# Patient Record
Sex: FEMALE | Race: WHITE | NOT HISPANIC OR LATINO | Employment: UNEMPLOYED | URBAN - NONMETROPOLITAN AREA
[De-identification: names, ages, dates, MRNs, and addresses within clinical notes are randomized per-mention and may not be internally consistent; named-entity substitution may affect disease eponyms.]

---

## 2017-03-23 ENCOUNTER — OFFICE VISIT (OUTPATIENT)
Dept: OBSTETRICS AND GYNECOLOGY | Facility: CLINIC | Age: 37
End: 2017-03-23

## 2017-03-23 VITALS
DIASTOLIC BLOOD PRESSURE: 68 MMHG | HEART RATE: 93 BPM | SYSTOLIC BLOOD PRESSURE: 103 MMHG | BODY MASS INDEX: 24.54 KG/M2 | HEIGHT: 60 IN | WEIGHT: 125 LBS

## 2017-03-23 DIAGNOSIS — Z01.419 ENCOUNTER FOR GYNECOLOGICAL EXAMINATION WITHOUT ABNORMAL FINDING: Primary | ICD-10-CM

## 2017-03-23 DIAGNOSIS — Z79.890 HORMONE REPLACEMENT THERAPY (POSTMENOPAUSAL): ICD-10-CM

## 2017-03-23 DIAGNOSIS — Z90.710 H/O HYSTERECTOMY FOR BENIGN DISEASE: ICD-10-CM

## 2017-03-23 PROCEDURE — 99395 PREV VISIT EST AGE 18-39: CPT | Performed by: NURSE PRACTITIONER

## 2017-03-23 RX ORDER — ESTRADIOL 0.1 MG/D
1 PATCH, EXTENDED RELEASE TRANSDERMAL 2 TIMES WEEKLY
Qty: 8 PATCH | Refills: 12 | Status: SHIPPED | OUTPATIENT
Start: 2017-03-23 | End: 2017-12-01 | Stop reason: SDUPTHER

## 2017-03-23 NOTE — PROGRESS NOTES
Subjective   Karla Perez is a 36 y.o.  here for annual exam and HRT refills. Has c/o not being able to stay asleep during the night.    Gynecologic Exam   The patient's pertinent negatives include no genital itching, genital lesions, genital odor, genital rash, vaginal bleeding or vaginal discharge. Pertinent negatives include no abdominal pain, dysuria, headaches or rash. She is sexually active. No, her partner does not have an STD. She uses hysterectomy for contraception. She is postmenopausal. Her past medical history is significant for endometriosis, a gynecological surgery, menorrhagia and ovarian cysts.   ANNETTE/BSO in  for endometriosis and ovarian cyst  No family hx of breast cancer.     The following portions of the patient's history were reviewed and updated as appropriate: allergies, current medications, past family history, past medical history, past social history, past surgical history and problem list.    Review of Systems   Constitutional: Negative for activity change, appetite change, fatigue and unexpected weight change.   Respiratory: Negative for apnea, chest tightness and shortness of breath.    Cardiovascular: Negative for chest pain, palpitations and leg swelling.   Gastrointestinal: Negative for abdominal distention and abdominal pain.   Genitourinary: Positive for menorrhagia. Negative for difficulty urinating, dyspareunia, dysuria, vaginal bleeding, vaginal discharge and vaginal pain.   Musculoskeletal: Negative for gait problem and myalgias.   Skin: Negative for color change and rash.   Neurological: Negative for weakness, light-headedness and headaches.   Hematological: Negative for adenopathy.   Psychiatric/Behavioral: Positive for sleep disturbance. Negative for decreased concentration and dysphoric mood. The patient is not nervous/anxious.        Objective   Physical Exam   Constitutional: She is oriented to person, place, and time. She appears well-developed and  well-nourished.   Cardiovascular: Normal rate, regular rhythm, normal heart sounds and intact distal pulses.    Pulmonary/Chest: Effort normal and breath sounds normal. Right breast exhibits no inverted nipple, no mass, no nipple discharge, no skin change and no tenderness. Left breast exhibits no inverted nipple, no mass, no nipple discharge, no skin change and no tenderness. Breasts are symmetrical. There is no breast swelling.   Bilateral breast implants intact.   Abdominal: Soft. Bowel sounds are normal. She exhibits no distension. There is no tenderness.   Genitourinary: No breast bleeding.   Genitourinary Comments: Pap smear not indicated. No GYN complaints. Pelvic exam deferred.   Lymphadenopathy:     She has no axillary adenopathy.   Neurological: She is alert and oriented to person, place, and time.   Skin: Skin is warm, dry and intact.   Psychiatric: She has a normal mood and affect. Her behavior is normal.   Nursing note and vitals reviewed.        Assessment/Plan   Karla was seen today for gynecologic exam.    Diagnoses and all orders for this visit:    Encounter for gynecological examination without abnormal finding    H/O hysterectomy for benign disease    Hormone replacement therapy (postmenopausal)    Other orders  -     VIVELLE-DOT 0.1 MG/24HR patch; Place 1 patch on the skin 2 (Two) Times a Week.

## 2017-07-19 PROCEDURE — 87077 CULTURE AEROBIC IDENTIFY: CPT | Performed by: NURSE PRACTITIONER

## 2017-07-19 PROCEDURE — 87480 CANDIDA DNA DIR PROBE: CPT | Performed by: NURSE PRACTITIONER

## 2017-07-19 PROCEDURE — 87660 TRICHOMONAS VAGIN DIR PROBE: CPT | Performed by: NURSE PRACTITIONER

## 2017-07-19 PROCEDURE — 87510 GARDNER VAG DNA DIR PROBE: CPT | Performed by: NURSE PRACTITIONER

## 2017-07-19 PROCEDURE — 87086 URINE CULTURE/COLONY COUNT: CPT | Performed by: NURSE PRACTITIONER

## 2017-07-19 PROCEDURE — 87186 SC STD MICRODIL/AGAR DIL: CPT | Performed by: NURSE PRACTITIONER

## 2017-09-27 ENCOUNTER — TELEPHONE (OUTPATIENT)
Dept: GENERAL RADIOLOGY | Facility: HOSPITAL | Age: 37
End: 2017-09-27

## 2017-12-01 ENCOUNTER — OFFICE VISIT (OUTPATIENT)
Dept: OBSTETRICS AND GYNECOLOGY | Facility: CLINIC | Age: 37
End: 2017-12-01

## 2017-12-01 VITALS
BODY MASS INDEX: 20.58 KG/M2 | DIASTOLIC BLOOD PRESSURE: 82 MMHG | SYSTOLIC BLOOD PRESSURE: 119 MMHG | HEIGHT: 61 IN | HEART RATE: 81 BPM | WEIGHT: 109 LBS

## 2017-12-01 DIAGNOSIS — Z79.890 HORMONE REPLACEMENT THERAPY (POSTMENOPAUSAL): ICD-10-CM

## 2017-12-01 DIAGNOSIS — Z90.710 H/O HYSTERECTOMY FOR BENIGN DISEASE: ICD-10-CM

## 2017-12-01 DIAGNOSIS — N89.8 VAGINAL ODOR: Primary | ICD-10-CM

## 2017-12-01 PROCEDURE — 99213 OFFICE O/P EST LOW 20 MIN: CPT | Performed by: NURSE PRACTITIONER

## 2017-12-01 RX ORDER — METRONIDAZOLE 7.5 MG/G
GEL VAGINAL 2 TIMES DAILY
Qty: 70 G | Refills: 0 | Status: SHIPPED | OUTPATIENT
Start: 2017-12-01 | End: 2018-04-20

## 2017-12-01 RX ORDER — ESTRADIOL 0.1 MG/D
1 PATCH, EXTENDED RELEASE TRANSDERMAL 2 TIMES WEEKLY
Qty: 8 PATCH | Refills: 12 | Status: SHIPPED | OUTPATIENT
Start: 2017-12-04 | End: 2018-04-04 | Stop reason: SDUPTHER

## 2017-12-01 NOTE — PROGRESS NOTES
"Subjective   Chief Complaint   Patient presents with   • Vaginal Discharge     Karla Perez is a 37 y.o. year old No obstetric history on file. presenting to be seen for evaluation of an abnormal vaginal discharge. The discharge is watery and white.  Her symptoms have been present for 2 week(s).  Additional she has noticed odor, UTI symptoms (dysuria) and vulvar itching.    She is sexually active.  In the past 12 months there has not been new sexual partners.  Condoms are not typically used.  She would not like to be screened for STD's at today's exam.     Prior to the onset of symptoms she was not on systemic antibiotics.  She has not recently changed soaps/detergents/toilet tissue.  Prior to this visit, she has used doxycycline in an attempt to improve her symptoms.    No LMP recorded. Patient has had a hysterectomy.    Current birth control method: status post hysterectomy and post menopausal status.    No Additional Complaints Reported    The following portions of the patient's history were reviewed and updated as appropriate:problem list, current medications, allergies, past medical history, past social history and past surgical history    Review of Systems   Constitutional: Negative for activity change, appetite change, fatigue and unexpected weight change.   Respiratory: Negative for chest tightness and shortness of breath.    Cardiovascular: Negative for chest pain, palpitations and leg swelling.   Gastrointestinal: Negative for abdominal distention, abdominal pain, nausea and vomiting.   Endocrine: Negative.    Genitourinary: Positive for vaginal discharge. Negative for difficulty urinating, dyspareunia, dysuria, genital sores, pelvic pain, urgency, vaginal bleeding and vaginal pain.         Objective   /82  Pulse 81  Ht 61\" (154.9 cm)  Wt 109 lb (49.4 kg)  Breastfeeding? No  BMI 20.6 kg/m2    General:  well developed; well nourished  no acute distress   Skin:  No suspicious lesions seen "   Pelvis: Clinical staff was present for exam  External genitalia:  normal appearance of the external genitalia including Bartholin's and [place]'s glands.  :  urethral meatus normal;  Vaginal:  normal pink mucosa without prolapse or lesions. discharge present -  watery and white;  Cervix:  absent.  Uterus:  absent.  Adnexa:  absent, bilateral.   One Swab obtained.     Lab Review   No data reviewed    Imaging   No data reviewed         Diagnoses and all orders for this visit:    Vaginal odor    Hormone replacement therapy (postmenopausal)    H/O hysterectomy for benign disease    Other orders  -     VIVELLE-DOT 0.1 MG/24HR patch; Place 1 patch on the skin 2 (Two) Times a Week.  -     metroNIDAZOLE (METROGEL VAGINAL) 0.75 % vaginal gel; Insert  into the vagina 2 (Two) Times a Day.        New Medications Ordered This Visit   Medications   • VIVELLE-DOT 0.1 MG/24HR patch     Sig: Place 1 patch on the skin 2 (Two) Times a Week.     Dispense:  8 patch     Refill:  12   • metroNIDAZOLE (METROGEL VAGINAL) 0.75 % vaginal gel     Sig: Insert  into the vagina 2 (Two) Times a Day.     Dispense:  70 g     Refill:  0         This note was electronically signed.    Yolande Ramirez, DANN  December 1, 2017

## 2017-12-07 ENCOUNTER — TELEPHONE (OUTPATIENT)
Dept: OBSTETRICS AND GYNECOLOGY | Facility: CLINIC | Age: 37
End: 2017-12-07

## 2017-12-07 RX ORDER — METRONIDAZOLE 500 MG/1
500 TABLET ORAL 2 TIMES DAILY
Qty: 14 TABLET | Refills: 0 | Status: SHIPPED | OUTPATIENT
Start: 2017-12-07 | End: 2017-12-14

## 2017-12-07 RX ORDER — AMOXICILLIN AND CLAVULANATE POTASSIUM 875; 125 MG/1; MG/1
1 TABLET, FILM COATED ORAL 2 TIMES DAILY
Qty: 14 TABLET | Refills: 0 | Status: SHIPPED | OUTPATIENT
Start: 2017-12-07 | End: 2017-12-14

## 2017-12-07 RX ORDER — FLUCONAZOLE 150 MG/1
TABLET ORAL
Qty: 2 TABLET | Refills: 0 | Status: SHIPPED | OUTPATIENT
Start: 2017-12-07 | End: 2018-04-20

## 2017-12-07 NOTE — TELEPHONE ENCOUNTER
Called and informed the pt of One Swab results and all the rx that she needs to be taking.  Pt verbalized understanding and I sent in Rx to [facility].

## 2017-12-07 NOTE — TELEPHONE ENCOUNTER
Thought she was suppose to get an antibiotic to take after intercourse but I want her to complete the abx for her current infections, then if she's still having issues after intercourse I will prescribe the doxy PRN.

## 2018-02-28 PROCEDURE — 87480 CANDIDA DNA DIR PROBE: CPT | Performed by: NURSE PRACTITIONER

## 2018-02-28 PROCEDURE — 87510 GARDNER VAG DNA DIR PROBE: CPT | Performed by: NURSE PRACTITIONER

## 2018-02-28 PROCEDURE — 87660 TRICHOMONAS VAGIN DIR PROBE: CPT | Performed by: NURSE PRACTITIONER

## 2018-04-06 RX ORDER — ESTRADIOL 0.1 MG/D
1 FILM, EXTENDED RELEASE TRANSDERMAL 2 TIMES WEEKLY
Qty: 8 PATCH | Refills: 11 | Status: SHIPPED | OUTPATIENT
Start: 2018-04-09 | End: 2018-06-19 | Stop reason: SDUPTHER

## 2018-05-31 PROCEDURE — 87480 CANDIDA DNA DIR PROBE: CPT | Performed by: FAMILY MEDICINE

## 2018-05-31 PROCEDURE — 87660 TRICHOMONAS VAGIN DIR PROBE: CPT | Performed by: FAMILY MEDICINE

## 2018-05-31 PROCEDURE — 87510 GARDNER VAG DNA DIR PROBE: CPT | Performed by: FAMILY MEDICINE

## 2018-06-19 ENCOUNTER — OFFICE VISIT (OUTPATIENT)
Dept: OBSTETRICS AND GYNECOLOGY | Facility: CLINIC | Age: 38
End: 2018-06-19

## 2018-06-19 VITALS
SYSTOLIC BLOOD PRESSURE: 106 MMHG | BODY MASS INDEX: 21.52 KG/M2 | DIASTOLIC BLOOD PRESSURE: 68 MMHG | HEIGHT: 61 IN | WEIGHT: 114 LBS

## 2018-06-19 DIAGNOSIS — N76.1 CHRONIC VAGINITIS: ICD-10-CM

## 2018-06-19 DIAGNOSIS — Z79.890 HORMONE REPLACEMENT THERAPY (POSTMENOPAUSAL): Primary | ICD-10-CM

## 2018-06-19 PROCEDURE — 99213 OFFICE O/P EST LOW 20 MIN: CPT | Performed by: OBSTETRICS & GYNECOLOGY

## 2018-06-19 RX ORDER — METRONIDAZOLE 500 MG/1
500 TABLET ORAL DAILY
Qty: 30 TABLET | Refills: 2 | Status: SHIPPED | OUTPATIENT
Start: 2018-06-19 | End: 2018-12-18 | Stop reason: SDUPTHER

## 2018-06-19 RX ORDER — ESTRADIOL 0.1 MG/D
1 FILM, EXTENDED RELEASE TRANSDERMAL 2 TIMES WEEKLY
Qty: 8 PATCH | Refills: 11 | Status: SHIPPED | OUTPATIENT
Start: 2018-06-21 | End: 2018-12-18 | Stop reason: SDUPTHER

## 2018-06-19 NOTE — PROGRESS NOTES
Subjective   Karla Perez is a 38 y.o. female.     Patient presents today to request refill for HRT  Underwent hysterectomy with BSO approximately 10 years ago  No menopause related symptoms with vivelle patch and she would like to continue this medication.  Additionally patient reports frequent BV infection for which she has been treated at walk-in clinics 12 times in the past 4 months. Discussed potential treatment options with R/B/A for each and patient would like to try Flagyl PO initially.  Not currently having vaginal discharge as she was recently treated.        The following portions of the patient's history were reviewed and updated as appropriate: allergies, current medications, past family history, past medical history, past social history, past surgical history and problem list.      Review of Systems   Endocrine: Negative for heat intolerance.   Genitourinary: Negative for vaginal bleeding and vaginal discharge.   Psychiatric/Behavioral: Negative for sleep disturbance.       Objective   Physical Exam   Constitutional: She is oriented to person, place, and time. She appears well-developed and well-nourished. No distress.   HENT:   Head: Normocephalic and atraumatic.   Right Ear: External ear normal.   Left Ear: External ear normal.   Nose: Nose normal.   Mouth/Throat: Oropharynx is clear and moist.   Neurological: She is alert and oriented to person, place, and time.   Skin: She is not diaphoretic.   Psychiatric: She has a normal mood and affect. Her behavior is normal. Judgment and thought content normal.   Vitals reviewed.      Assessment/Plan   Karla was seen today for hormone replacement therapy and personal problem.    Diagnoses and all orders for this visit:    Hormone replacement therapy (postmenopausal)    Chronic vaginitis    Other orders  -     estradiol (MINIVELLE, VIVELLE-DOT) 0.1 MG/24HR patch; Place 1 patch on the skin 2 (Two) Times a Week.  -     metroNIDAZOLE (FLAGYL) 500 MG  tablet; Take 1 tablet by mouth Daily.       Flagyl 500 for chronic BV x3 mo  Continue Vivelle dot  F/u 6 mo and PRN to confirm improvement in chronic recurrent BV

## 2018-06-22 ENCOUNTER — TELEPHONE (OUTPATIENT)
Dept: OBSTETRICS AND GYNECOLOGY | Facility: CLINIC | Age: 38
End: 2018-06-22

## 2018-06-22 NOTE — TELEPHONE ENCOUNTER
----- Message from Genoveva Patel sent at 6/21/2018  3:48 PM CDT -----  Regarding: refill hormone patches  Contact: [phone]  Pt upset,Been waiting on her hormones,[facility] said  will not approve them...please call    I called this patient again and left her a message informing her that the pa is done and insurance requires 72 hours for a response.  I used cover my meds and did the pa on her medication.

## 2018-06-22 NOTE — TELEPHONE ENCOUNTER
----- Message from Genoveva Patel sent at 6/21/2018  3:48 PM CDT -----  Regarding: refill hormone patches  Contact: [phone]  Pt upset,Been waiting on her hormones,[facility] said  will not approve them...please call

## 2018-12-18 ENCOUNTER — TELEPHONE (OUTPATIENT)
Dept: OBSTETRICS AND GYNECOLOGY | Facility: CLINIC | Age: 38
End: 2018-12-18

## 2018-12-18 RX ORDER — METRONIDAZOLE 500 MG/1
500 TABLET ORAL DAILY
Qty: 30 TABLET | Refills: 2 | Status: SHIPPED | OUTPATIENT
Start: 2018-12-18 | End: 2021-03-05

## 2018-12-18 RX ORDER — ESTRADIOL 0.1 MG/D
1 FILM, EXTENDED RELEASE TRANSDERMAL 2 TIMES WEEKLY
Qty: 8 PATCH | Refills: 3 | Status: SHIPPED | OUTPATIENT
Start: 2018-12-20 | End: 2019-01-15 | Stop reason: SDUPTHER

## 2018-12-18 NOTE — TELEPHONE ENCOUNTER
----- Message from Coco Urena sent at 12/18/2018  9:24 AM CST -----  Contact: [phone]  Patient is wanting to get her antibodics and hormone patch called into [facility]. She wasn't able to keep her appointment today and cant get back with Dr. Napoles until 01/15/19      Notified the pt that I sent in a couple of refills on her medications.

## 2019-01-15 ENCOUNTER — OFFICE VISIT (OUTPATIENT)
Dept: OBSTETRICS AND GYNECOLOGY | Facility: CLINIC | Age: 39
End: 2019-01-15

## 2019-01-15 VITALS
DIASTOLIC BLOOD PRESSURE: 59 MMHG | BODY MASS INDEX: 23.03 KG/M2 | SYSTOLIC BLOOD PRESSURE: 98 MMHG | HEIGHT: 61 IN | HEART RATE: 65 BPM | WEIGHT: 122 LBS

## 2019-01-15 DIAGNOSIS — Z79.890 HORMONE REPLACEMENT THERAPY (POSTMENOPAUSAL): ICD-10-CM

## 2019-01-15 DIAGNOSIS — B96.89 BV (BACTERIAL VAGINOSIS): ICD-10-CM

## 2019-01-15 DIAGNOSIS — Z90.710 H/O HYSTERECTOMY FOR BENIGN DISEASE: ICD-10-CM

## 2019-01-15 DIAGNOSIS — N95.1 MENOPAUSAL SYNDROME: Primary | ICD-10-CM

## 2019-01-15 DIAGNOSIS — N76.0 BV (BACTERIAL VAGINOSIS): ICD-10-CM

## 2019-01-15 PROCEDURE — 99213 OFFICE O/P EST LOW 20 MIN: CPT | Performed by: OBSTETRICS & GYNECOLOGY

## 2019-01-15 RX ORDER — SULFAMETHOXAZOLE AND TRIMETHOPRIM 400; 80 MG/1; MG/1
1 TABLET ORAL DAILY
Qty: 30 TABLET | Refills: 12 | Status: SHIPPED | OUTPATIENT
Start: 2019-01-15 | End: 2019-11-26 | Stop reason: SDUPTHER

## 2019-01-15 RX ORDER — ESTRADIOL 0.1 MG/D
1 FILM, EXTENDED RELEASE TRANSDERMAL 2 TIMES WEEKLY
Qty: 8 PATCH | Refills: 3 | Status: SHIPPED | OUTPATIENT
Start: 2019-01-17 | End: 2019-11-26 | Stop reason: SDUPTHER

## 2019-01-15 RX ORDER — FLUCONAZOLE 150 MG/1
TABLET ORAL
Qty: 2 TABLET | Refills: 12 | Status: SHIPPED | OUTPATIENT
Start: 2019-01-15 | End: 2019-11-26 | Stop reason: SDUPTHER

## 2019-01-15 NOTE — PROGRESS NOTES
Karla Arango is a 38 y.o. y/o female.     Chief Complaint: Postmenopausal woman requesting hormone replacement therapy, and she has recurrent bacterial vaginosis and urinary tract infection    HPI:   38 y.o. y/o .  No LMP recorded (lmp unknown). Patient has had a hysterectomy..  Menarche age:13    Past medical history significant for menopausal syndrome and recurrent vaginal infections.    Past surgical history significant for  section and tubal ligation, bilateral breast implants, and hysterectomy with BSO.    She is on a twice weekly estradiol patch.    She is  and is a .    Smokes pack cigarettes per day.    Family history significant for mother dying at age 63 of a brain aneurysm.  Father  at age 65 of heart attack.  There is no family history of breast cancer, uterine cancer, ovarian cancer, colon cancer.    She has been on daily Flagyl for BV prophylaxis.  She says that the Flagyl was carried upwards,.  In the past she has taken Bactrim around the time of intercourse, and that seemed to work pretty well for.  One to treat her with Bactrim to take when necessary as well as Diflucan to take as necessary.  I spent 25 minutes in direct patient care with this patient. 20 minutes, or more than 50% of this encounter, was spent in counseling and coordination of care.        Obstetric History  #: 1, Date: 06, Sex: Female, Weight: 3685 g (8 lb 2 oz), GA: 40w0d, Delivery: , Unspecified, Apgar1: None, Apgar5: None, Living: Living, Birth Comments: None      Review of Systems   Constitutional: Negative for activity change, appetite change, chills, diaphoresis, fatigue, fever and unexpected weight change.   Gastrointestinal: Negative for abdominal pain, constipation, diarrhea and nausea.   Genitourinary: Negative for difficulty urinating, dyspareunia, dysuria, menstrual problem, pelvic pain, urgency, vaginal bleeding, vaginal discharge and vaginal pain.   Neurological:  Negative for headaches.   Psychiatric/Behavioral: Positive for dysphoric mood and sleep disturbance. The patient is nervous/anxious.    All other systems reviewed and are negative.     Breast ROS: negative    The following portions of the patient's history were reviewed and updated as appropriate: allergies, current medications, past family history, past medical history, past social history, past surgical history and problem list.    No Known Allergies     Outpatient Medications Prior to Visit   Medication Sig Dispense Refill   • metroNIDAZOLE (FLAGYL) 500 MG tablet Take 1 tablet by mouth Daily. 30 tablet 2   • estradiol (MINIVELLE, VIVELLE-DOT) 0.1 MG/24HR patch Place 1 patch on the skin as directed by provider 2 (Two) Times a Week. 8 patch 3     No facility-administered medications prior to visit.         The patient has a family history of   Family History   Problem Relation Age of Onset   • Cerebral aneurysm Mother    • Heart disease Father    • Emphysema Father    • Cirrhosis Father         alcoholic cirrhosis of the liver   • Diabetes Maternal Uncle    • Ovarian cancer Maternal Grandmother         Past Medical History:   Diagnosis Date   • Abdominal pain     chronic      • Allergic rhinitis    • Backache    • Depressive disorder    • Disturbance of skin sensation    • Encounter for gynecological examination     Gynecologic examination      • Generalized anxiety disorder    • H/O breast implant    • History of colonoscopy 2013    Colon endoscopy 23519 (1) - Internal & external hemorrhoids found   • Hormone replacement therapy    • Left lower quadrant pain    • Leukorrhea, not specified as infective    • Menopause     S/P hysterectomy      • Migraine    • Right lower quadrant pain    • Spasm of back muscles    • Tobacco dependence syndrome    • Vulvovaginitis         OB History      Para Term  AB Living    1 1 1     1    SAB TAB Ectopic Molar Multiple Live Births              1        "    Social History     Socioeconomic History   • Marital status: Single     Spouse name: Not on file   • Number of children: Not on file   • Years of education: Not on file   • Highest education level: Not on file   Social Needs   • Financial resource strain: Not on file   • Food insecurity - worry: Not on file   • Food insecurity - inability: Not on file   • Transportation needs - medical: Not on file   • Transportation needs - non-medical: Not on file   Occupational History   • Not on file   Tobacco Use   • Smoking status: Current Every Day Smoker     Packs/day: 1.00     Years: 10.00     Pack years: 10.00   • Smokeless tobacco: Never Used   Substance and Sexual Activity   • Alcohol use: Yes     Comment: drinks weekly   • Drug use: No   • Sexual activity: Yes     Partners: Male     Birth control/protection: Surgical     Comment: Hysterectomy   Other Topics Concern   • Not on file   Social History Narrative   • Not on file        Past Surgical History:   Procedure Laterality Date   • BREAST SURGERY      Breast reduction (1)   •  SECTION     • DIAGNOSTIC LAPAROSCOPY  2001    (1) - Examination under anesthesia, diagnostic laparoscopy. Chronic pelvic pain. same with stage 1 endometriosis    • OTHER SURGICAL HISTORY  2003    (1) - Shave excision, 5 mm mole, central chest   • OTHER SURGICAL HISTORY  2003    (1) - Shave excision, left anterior shoulder.Intradermal nevi   • PAP SMEAR  01/10/2002    Normal   • TOTAL ABDOMINAL HYSTERECTOMY WITH SALPINGO OOPHORECTOMY  2009    Dr Shamir Barroso   • TUBAL ABDOMINAL LIGATION      Dr Teodoro Moody        Patient Active Problem List   Diagnosis   • Hormone replacement therapy (postmenopausal)   • H/O hysterectomy for benign disease   • Menopausal syndrome   • BV (bacterial vaginosis)        Documented Vitals    01/15/19 1431   BP: 98/59   Pulse: 65   Weight: 55.3 kg (122 lb)   Height: 154.9 cm (61\")   PainSc: 0-No pain        Body mass index is " 23.05 kg/m².    Physical Exam   Constitutional: She is oriented to person, place, and time. She appears well-developed and well-nourished. No distress.   Well-developed well-nourished white female weighing 122 pounds with BMI 23.1.  Blood pressure 98/59.  Pulse 65.   HENT:   Head: Normocephalic and atraumatic.   Eyes: Conjunctivae and EOM are normal. Pupils are equal, round, and reactive to light.   Neck: Normal range of motion. Neck supple. No JVD present. No tracheal deviation present. No thyromegaly present.   Cardiovascular: Normal rate, regular rhythm, normal heart sounds and intact distal pulses. Exam reveals no gallop and no friction rub.   No murmur heard.  Pulmonary/Chest: Effort normal and breath sounds normal. No stridor. No respiratory distress. She has no wheezes. She has no rales. She exhibits no tenderness.   Abdominal: Soft. Bowel sounds are normal. She exhibits no distension and no mass. There is no tenderness. There is no rebound and no guarding. No hernia.   Musculoskeletal: Normal range of motion. She exhibits no edema, tenderness or deformity.   Lymphadenopathy:     She has no cervical adenopathy.   Neurological: She is alert and oriented to person, place, and time. She has normal reflexes. She displays normal reflexes. No cranial nerve deficit. She exhibits normal muscle tone. Coordination normal.   Skin: Skin is warm and dry. No rash noted. She is not diaphoretic. No erythema. No pallor.   Psychiatric: She has a normal mood and affect. Her behavior is normal. Judgment and thought content normal.   Nursing note and vitals reviewed.       Assessment        Diagnosis Plan   1. Menopausal syndrome     2. BV (bacterial vaginosis)     3. H/O hysterectomy for benign disease     4. Hormone replacement therapy (postmenopausal)         Plan         New Medications Ordered This Visit   Medications   • estradiol (MINIVELLE, VIVELLE-DOT) 0.1 MG/24HR patch     Sig: Place 1 patch on the skin as directed by  provider 2 (Two) Times a Week.     Dispense:  8 patch     Refill:  3     Please notify if prior auth is required   • sulfamethoxazole-trimethoprim (BACTRIM,SEPTRA) 400-80 MG tablet     Sig: Take 1 tablet by mouth Daily.     Dispense:  30 tablet     Refill:  12   • fluconazole (DIFLUCAN) 150 MG tablet     Sig: Take one po today and take one po in 4 days.     Dispense:  2 tablet     Refill:  12     1. Refilled twice weekly estradiol patch.  2. Bactrim to take prophylactically around the time of intercourse.  3. Diflucan to take when necessary.  4. Encouraged in diet and exercise.  5. Handouts on depression, hot flashes, exercise, and vitamin use.   6. Follow-up in 6 months.  Follow-up sooner as needed.            This document has been electronically signed by Chriss Napoles MD on January 15, 2019 3:09 PM

## 2019-11-26 ENCOUNTER — OFFICE VISIT (OUTPATIENT)
Dept: OBSTETRICS AND GYNECOLOGY | Facility: CLINIC | Age: 39
End: 2019-11-26

## 2019-11-26 VITALS
DIASTOLIC BLOOD PRESSURE: 76 MMHG | OXYGEN SATURATION: 97 % | SYSTOLIC BLOOD PRESSURE: 130 MMHG | HEART RATE: 63 BPM | RESPIRATION RATE: 16 BRPM | WEIGHT: 128.4 LBS | BODY MASS INDEX: 24.24 KG/M2 | HEIGHT: 61 IN

## 2019-11-26 DIAGNOSIS — Z90.710 H/O HYSTERECTOMY FOR BENIGN DISEASE: ICD-10-CM

## 2019-11-26 DIAGNOSIS — N95.1 MENOPAUSAL SYNDROME: ICD-10-CM

## 2019-11-26 DIAGNOSIS — Z79.890 HORMONE REPLACEMENT THERAPY (POSTMENOPAUSAL): Primary | ICD-10-CM

## 2019-11-26 PROCEDURE — 99213 OFFICE O/P EST LOW 20 MIN: CPT | Performed by: OBSTETRICS & GYNECOLOGY

## 2019-11-26 RX ORDER — SULFAMETHOXAZOLE AND TRIMETHOPRIM 400; 80 MG/1; MG/1
1 TABLET ORAL DAILY
Qty: 30 TABLET | Refills: 12 | Status: SHIPPED | OUTPATIENT
Start: 2019-11-26 | End: 2021-03-05

## 2019-11-26 RX ORDER — FLUCONAZOLE 150 MG/1
TABLET ORAL
Qty: 2 TABLET | Refills: 12 | Status: SHIPPED | OUTPATIENT
Start: 2019-11-26 | End: 2021-03-05

## 2019-11-26 RX ORDER — ESTRADIOL 0.1 MG/D
1 FILM, EXTENDED RELEASE TRANSDERMAL 2 TIMES WEEKLY
Qty: 8 PATCH | Refills: 3 | Status: SHIPPED | OUTPATIENT
Start: 2019-11-28 | End: 2020-02-25

## 2019-11-26 NOTE — PROGRESS NOTES
Karla Arango is a 39 y.o. y/o female.     Chief Complaint: Hormone replacement therapy, persistent vaginal infections.    HPI:   39 y.o. .  No LMP recorded (lmp unknown). Patient has had a hysterectomy..  Patient presents for follow-up today, had a hysterectomy in her 20s with both ovaries removed.  Has been on estrogen patch over the last several years and this has been the only thing that has worked for her, where pills have not worked in the past.  Given that the patient has had an oophorectomy at an early age estrogen is essential for her bone health and heart health, and despite her smoking still encouraged her to continue on estrogen at this time.  Patient aware of risk of blood clot and stroke with estrogen and smoking over age 35 however desires to continue estrogen which I feel is appropriate.  Patient does state that she is having some breakthrough hot flashes despite being on the highest dose of the estrogen patch.  She states that she is currently going through a lot of stress in life and this may be causing some of this, will refill her patch at this time as she is out and see how she does over the next 3 months if not improved in 3 months we will consider adding a low-dose estrogen tablet on top of the patch.  Also did briefly discuss Effexor for control of hot flashes with patient and she is considering this as well.  Patient also needs refill on Bactrim and Diflucan, patient has had chronic vaginal infections over the last 20 to 30 years after sex and the only thing that has helped has been Bactrim one taken with intercourse, also sometimes she will get yeast infections and Diflucan has help with that.     Review of Systems   Constitutional: Negative for chills, fatigue and fever.   HENT: Negative for sore throat.    Eyes: Negative for visual disturbance.   Respiratory: Negative for cough, shortness of breath and wheezing.    Cardiovascular: Negative for chest pain, palpitations and leg  swelling.   Gastrointestinal: Negative for abdominal pain, diarrhea, nausea and vomiting.   Genitourinary: Negative for dysuria, flank pain, frequency, vaginal bleeding, vaginal discharge and vaginal pain.   Neurological: Negative for syncope, light-headedness and headaches.   Psychiatric/Behavioral: Negative for dysphoric mood and suicidal ideas. The patient is not nervous/anxious.         The following portions of the patient's history were reviewed and updated as appropriate: allergies, current medications, past family history, past medical history, past social history, past surgical history and problem list.    No Known Allergies     Prior to Admission medications    Medication Sig Start Date End Date Taking? Authorizing Provider   estradiol (MINIVELLE, VIVELLE-DOT) 0.1 MG/24HR patch Place 1 patch on the skin as directed by provider 2 (Two) Times a Week. 1/17/19  Yes Chriss Napoles MD   fluconazole (DIFLUCAN) 150 MG tablet Take one po today and take one po in 4 days. 1/15/19  Yes Chriss Napoles MD   metroNIDAZOLE (FLAGYL) 500 MG tablet Take 1 tablet by mouth Daily. 12/18/18  Yes Chriss Napoles MD   sulfamethoxazole-trimethoprim (BACTRIM,SEPTRA) 400-80 MG tablet Take 1 tablet by mouth Daily. 1/15/19  Yes Chriss Napoles MD        The patient has a family history of   Family History   Problem Relation Age of Onset   • Cerebral aneurysm Mother    • Heart disease Father    • Emphysema Father    • Cirrhosis Father         alcoholic cirrhosis of the liver   • Diabetes Maternal Uncle    • Ovarian cancer Maternal Grandmother         Past Medical History:   Diagnosis Date   • Abdominal pain     chronic      • Allergic rhinitis    • Backache    • Depressive disorder    • Disturbance of skin sensation    • Encounter for gynecological examination     Gynecologic examination      • Generalized anxiety disorder    • H/O breast implant    • History of colonoscopy 09/19/2013    Colon endoscopy 66612 (1) - Internal & external  hemorrhoids found   • Hormone replacement therapy    • Left lower quadrant pain    • Leukorrhea, not specified as infective    • Menopause     S/P hysterectomy      • Migraine    • Right lower quadrant pain    • Spasm of back muscles    • Tobacco dependence syndrome    • Vulvovaginitis         OB History      Para Term  AB Living    1 1 1     1    SAB TAB Ectopic Molar Multiple Live Births              1           Social History     Socioeconomic History   • Marital status: Single     Spouse name: Not on file   • Number of children: Not on file   • Years of education: Not on file   • Highest education level: Not on file   Tobacco Use   • Smoking status: Current Every Day Smoker     Packs/day: 1.00     Years: 10.00     Pack years: 10.00   • Smokeless tobacco: Never Used   Substance and Sexual Activity   • Alcohol use: Yes     Comment: drinks weekly   • Drug use: No   • Sexual activity: Yes     Partners: Male     Birth control/protection: Surgical     Comment: Hysterectomy        Past Surgical History:   Procedure Laterality Date   • BREAST SURGERY      Breast reduction (1)   •  SECTION     • DIAGNOSTIC LAPAROSCOPY  2001    (1) - Examination under anesthesia, diagnostic laparoscopy. Chronic pelvic pain. same with stage 1 endometriosis    • OTHER SURGICAL HISTORY  2003    (1) - Shave excision, 5 mm mole, central chest   • OTHER SURGICAL HISTORY  2003    (1) - Shave excision, left anterior shoulder.Intradermal nevi   • PAP SMEAR  01/10/2002    Normal   • TOTAL ABDOMINAL HYSTERECTOMY WITH SALPINGO OOPHORECTOMY  2009    Dr Shamir Barroso   • TUBAL ABDOMINAL LIGATION      Dr Teodoro Moody        Patient Active Problem List   Diagnosis   • Hormone replacement therapy (postmenopausal)   • H/O hysterectomy for benign disease   • Menopausal syndrome   • BV (bacterial vaginosis)        Documented Vitals    19 1044   BP: 130/76   Pulse: 63   Resp: 16   SpO2: 97%   Weight: 58.2  "kg (128 lb 6.4 oz)   Height: 154.9 cm (61\")   PainSc: 0-No pain        Body mass index is 24.26 kg/m².    Physical Exam   Constitutional: She is oriented to person, place, and time. She appears well-developed and well-nourished. No distress.   HENT:   Head: Normocephalic.   Cardiovascular: Normal rate, regular rhythm, normal heart sounds and intact distal pulses.   No murmur heard.  Pulmonary/Chest: Effort normal and breath sounds normal. No respiratory distress. She has no wheezes.   Abdominal: Soft. Bowel sounds are normal. She exhibits no distension and no mass. There is no tenderness. There is no rebound and no guarding.   Musculoskeletal: Normal range of motion. She exhibits no edema, tenderness or deformity.   Neurological: She is alert and oriented to person, place, and time.   Skin: Skin is warm and dry. No erythema.   Psychiatric: She has a normal mood and affect. Her behavior is normal. Judgment and thought content normal.   Vitals reviewed.      Laboratory Data:   No results for input(s): GLUCOSE, BUN, CREATININE, NA, K, CL, CO2, CALCIUM, PROTEINTOT, ALBUMIN, ALT, AST, ALKPHOS, BILITOT, EGFRIFNONA, GLOB, AGRATIO, BCR, ANIONGAP, BILIDIR, INDBILI in the last 86334 hours.  No results for input(s): WBC, RBC, HGB, HCT, MCV, MCH, MCHC, RDW, RDWSD, MPV, PLT in the last 24234 hours.  No results for input(s): HCGQUAL in the last 73260 hours.    Assessment   Patient overall doing well at this time.  Needs replacement of hormone patch along with Bactrim and Diflucan.  Patient has no complaints other than hot flashes breaking through her patches, however she has been out of the patch for approximately 1 month and this may be playing a role in that.  Plan to refill patch as patient is in surgical menopause, will also refill Bactrim and Diflucan.     Diagnosis Plan   1. Hormone replacement therapy (postmenopausal)     2. H/O hysterectomy for benign disease     3. Menopausal syndrome         This document has been " electronically signed by Jae Ferreira DO on November 26, 2019 10:59 AM

## 2020-02-25 RX ORDER — ESTRADIOL 0.1 MG/D
FILM, EXTENDED RELEASE TRANSDERMAL
Qty: 24 PATCH | Refills: 6 | Status: SHIPPED | OUTPATIENT
Start: 2020-02-25 | End: 2020-09-22 | Stop reason: SDUPTHER

## 2020-09-22 RX ORDER — ESTRADIOL 0.1 MG/D
1 FILM, EXTENDED RELEASE TRANSDERMAL 2 TIMES WEEKLY
Qty: 24 PATCH | Refills: 6 | Status: SHIPPED | OUTPATIENT
Start: 2020-09-24 | End: 2021-08-17 | Stop reason: SDUPTHER

## 2021-03-05 ENCOUNTER — OFFICE VISIT (OUTPATIENT)
Dept: FAMILY MEDICINE CLINIC | Facility: CLINIC | Age: 41
End: 2021-03-05

## 2021-03-05 VITALS
SYSTOLIC BLOOD PRESSURE: 120 MMHG | DIASTOLIC BLOOD PRESSURE: 82 MMHG | HEIGHT: 61 IN | BODY MASS INDEX: 27.34 KG/M2 | WEIGHT: 144.8 LBS

## 2021-03-05 DIAGNOSIS — Z13.29 SCREENING FOR HYPOTHYROIDISM: ICD-10-CM

## 2021-03-05 DIAGNOSIS — G89.29 CHRONIC CERVICAL PAIN: ICD-10-CM

## 2021-03-05 DIAGNOSIS — B35.4 RINGWORM, BODY: ICD-10-CM

## 2021-03-05 DIAGNOSIS — G89.29 CHRONIC PAIN OF LEFT ANKLE: ICD-10-CM

## 2021-03-05 DIAGNOSIS — G43.001 MIGRAINE WITHOUT AURA AND WITH STATUS MIGRAINOSUS, NOT INTRACTABLE: Primary | ICD-10-CM

## 2021-03-05 DIAGNOSIS — G89.29 CHRONIC MIDLINE LOW BACK PAIN WITH RIGHT-SIDED SCIATICA: ICD-10-CM

## 2021-03-05 DIAGNOSIS — Z11.59 NEED FOR HEPATITIS C SCREENING TEST: ICD-10-CM

## 2021-03-05 DIAGNOSIS — M25.572 CHRONIC PAIN OF LEFT ANKLE: ICD-10-CM

## 2021-03-05 DIAGNOSIS — Z13.220 SCREENING FOR HYPERCHOLESTEROLEMIA: ICD-10-CM

## 2021-03-05 DIAGNOSIS — M25.552 CHRONIC HIP PAIN, BILATERAL: ICD-10-CM

## 2021-03-05 DIAGNOSIS — G89.29 CHRONIC HIP PAIN, BILATERAL: ICD-10-CM

## 2021-03-05 DIAGNOSIS — Z76.89 ENCOUNTER TO ESTABLISH CARE: ICD-10-CM

## 2021-03-05 DIAGNOSIS — M25.551 CHRONIC HIP PAIN, BILATERAL: ICD-10-CM

## 2021-03-05 DIAGNOSIS — M54.2 CHRONIC CERVICAL PAIN: ICD-10-CM

## 2021-03-05 DIAGNOSIS — M54.41 CHRONIC MIDLINE LOW BACK PAIN WITH RIGHT-SIDED SCIATICA: ICD-10-CM

## 2021-03-05 PROCEDURE — 99214 OFFICE O/P EST MOD 30 MIN: CPT | Performed by: NURSE PRACTITIONER

## 2021-03-05 PROCEDURE — 96372 THER/PROPH/DIAG INJ SC/IM: CPT | Performed by: NURSE PRACTITIONER

## 2021-03-05 RX ORDER — KETOROLAC TROMETHAMINE 30 MG/ML
60 INJECTION, SOLUTION INTRAMUSCULAR; INTRAVENOUS ONCE
Status: COMPLETED | OUTPATIENT
Start: 2021-03-05 | End: 2021-03-05

## 2021-03-05 RX ORDER — KETOCONAZOLE 20 MG/G
CREAM TOPICAL DAILY
Qty: 30 G | Refills: 0 | OUTPATIENT
Start: 2021-03-05 | End: 2023-02-16

## 2021-03-05 RX ORDER — NABUMETONE 750 MG/1
750 TABLET, FILM COATED ORAL 2 TIMES DAILY PRN
Qty: 60 TABLET | Refills: 3 | Status: SHIPPED | OUTPATIENT
Start: 2021-03-05

## 2021-03-05 RX ORDER — TRIAMCINOLONE ACETONIDE 40 MG/ML
80 INJECTION, SUSPENSION INTRA-ARTICULAR; INTRAMUSCULAR ONCE
Status: COMPLETED | OUTPATIENT
Start: 2021-03-05 | End: 2021-03-05

## 2021-03-05 RX ADMIN — TRIAMCINOLONE ACETONIDE 80 MG: 40 INJECTION, SUSPENSION INTRA-ARTICULAR; INTRAMUSCULAR at 10:06

## 2021-03-05 RX ADMIN — KETOROLAC TROMETHAMINE 60 MG: 30 INJECTION, SOLUTION INTRAMUSCULAR; INTRAVENOUS at 10:05

## 2021-03-05 NOTE — PROGRESS NOTES
"Subjective   Karla Arango is a 40 y.o. female. Establish primary care.  Over the past several months to years has been complaining of intermittent migraines with increasing neck, back, hip pain and left ankle pain.  \"My hips hurt so bad I cannot even hardly walk anymore.  I have had to cut down on my hours at work.\"  Has been a  for the last 26 years.  \"Also I have got this rash on my breast.  It started out as one little brown spot and now there are two spots.\"    Migraine   This is a chronic problem. The current episode started more than 1 year ago. The problem occurs intermittently. The problem has been waxing and waning. The pain is located in the bilateral region. The pain radiates to the left neck and right neck. The quality of the pain is described as aching and throbbing. The pain is at a severity of 10/10. The pain is severe. Associated symptoms include back pain, nausea, neck pain, phonophobia, photophobia and scalp tenderness. Pertinent negatives include no numbness. Nothing aggravates the symptoms. She has tried acetaminophen, darkened room and NSAIDs for the symptoms. The treatment provided moderate relief. Her past medical history is significant for migraines in the family.   Neck Pain   This is a chronic problem. The current episode started more than 1 year ago. The problem occurs constantly. The problem has been gradually worsening. The pain is associated with nothing. The pain is present in the midline. The quality of the pain is described as aching and shooting. The pain is at a severity of 9/10. The pain is severe. The symptoms are aggravated by position and stress. Associated symptoms include headaches and photophobia. Pertinent negatives include no numbness. She has tried acetaminophen and NSAIDs for the symptoms. The treatment provided mild relief.   Back Pain  This is a chronic problem. The current episode started more than 1 year ago. The problem occurs constantly. The problem has " been gradually worsening since onset. The pain is present in the lumbar spine. The quality of the pain is described as aching. The pain is at a severity of 9/10. The pain is severe. The symptoms are aggravated by position and stress. Associated symptoms include headaches and pelvic pain. Pertinent negatives include no bladder incontinence, bowel incontinence, numbness or perianal numbness. Risk factors include obesity and poor posture. She has tried NSAIDs and bed rest for the symptoms. The treatment provided mild relief.   Hip Pain   There was no injury mechanism. The quality of the pain is described as aching, shooting and stabbing. The pain is at a severity of 10/10. The pain is severe. The pain has been constant since onset. Pertinent negatives include no inability to bear weight, loss of motion or numbness. The symptoms are aggravated by movement and weight bearing. She has tried acetaminophen, rest and NSAIDs for the symptoms. The treatment provided no relief.   Ankle Pain   There was no injury mechanism. The pain is present in the left ankle. The pain is at a severity of 8/10. The pain is severe. The pain has been intermittent since onset. Pertinent negatives include no inability to bear weight, loss of motion or numbness. The symptoms are aggravated by movement, weight bearing and palpation. She has tried acetaminophen, rest, non-weight bearing and NSAIDs for the symptoms. The treatment provided mild relief.   Rash  This is a new problem. The current episode started 1 to 4 weeks ago. The problem is unchanged. The affected locations include the chest. The rash is characterized by scaling, redness and itchiness. She was exposed to nothing. Pertinent negatives include no facial edema or shortness of breath. Past treatments include nothing. The treatment provided no relief.        The following portions of the patient's history were reviewed and updated as appropriate:     She  has a past medical history of  Abdominal pain, Allergic rhinitis, Backache, Depressive disorder, Disturbance of skin sensation, Encounter for gynecological examination, Generalized anxiety disorder, H/O breast implant, History of colonoscopy (2013), Hormone replacement therapy, Left lower quadrant pain, Leukorrhea, not specified as infective, Menopause, Migraine, Right lower quadrant pain, Spasm of back muscles, Tobacco dependence syndrome, and Vulvovaginitis.  She does not have any pertinent problems on file.  She  has a past surgical history that includes  section; Laparoscopy (2001); Other surgical history (2003); Other surgical history (2003); Pap Smear (01/10/2002); Tubal ligation (); Total abdominal hysterectomy w/ bilateral salpingoophorectomy (2009); and Breast surgery.  Her family history includes Cerebral aneurysm in her mother; Cirrhosis in her father; Diabetes in her maternal uncle; Emphysema in her father; Heart disease in her father; Ovarian cancer in her maternal grandmother.  She  reports that she has been smoking. She has a 10.00 pack-year smoking history. She has never used smokeless tobacco. She reports current alcohol use. She reports that she does not use drugs.  Current Outpatient Medications   Medication Sig Dispense Refill   • estradiol (MINIVELLE, VIVELLE-DOT) 0.1 MG/24HR patch Place 1 patch on the skin as directed by provider 2 (Two) Times a Week. 24 patch 6   • ketoconazole (NIZORAL) 2 % cream Apply  topically to the appropriate area as directed Daily. 30 g 0   • nabumetone (Relafen) 750 MG tablet Take 1 tablet by mouth 2 (Two) Times a Day As Needed for Mild Pain . 60 tablet 3     No current facility-administered medications for this visit.     Current Outpatient Medications on File Prior to Visit   Medication Sig   • estradiol (MINIVELLE, VIVELLE-DOT) 0.1 MG/24HR patch Place 1 patch on the skin as directed by provider 2 (Two) Times a Week.     No current facility-administered  "medications on file prior to visit.     She has No Known Allergies..    Review of Systems   Constitutional: Negative.  Negative for chills and diaphoresis.   Eyes: Positive for photophobia.   Respiratory: Negative.  Negative for shortness of breath.    Cardiovascular: Negative.    Gastrointestinal: Positive for nausea. Negative for bowel incontinence.   Genitourinary: Positive for pelvic pain. Negative for bladder incontinence.   Musculoskeletal: Positive for arthralgias, back pain and neck pain.   Skin: Positive for rash.   Neurological: Positive for headaches. Negative for numbness.   Psychiatric/Behavioral: Negative.  Negative for confusion.       Objective    Visit Vitals  /82   Ht 154.9 cm (61\")   Wt 65.7 kg (144 lb 12.8 oz)   LMP  (LMP Unknown)   BMI 27.36 kg/m²       Physical Exam  Vitals signs and nursing note reviewed.   Constitutional:       Appearance: She is well-developed.   HENT:      Head: Normocephalic.      Right Ear: External ear normal.      Left Ear: External ear normal.      Mouth/Throat:      Mouth: Mucous membranes are moist.      Pharynx: Oropharynx is clear.   Eyes:      Pupils: Pupils are equal, round, and reactive to light.   Neck:      Musculoskeletal: Normal range of motion and neck supple.   Cardiovascular:      Rate and Rhythm: Normal rate and regular rhythm.      Heart sounds: Normal heart sounds.   Pulmonary:      Effort: Pulmonary effort is normal.      Breath sounds: Normal breath sounds.   Chest:       Abdominal:      General: Bowel sounds are normal.      Palpations: Abdomen is soft.   Musculoskeletal:      Left ankle: She exhibits decreased range of motion, swelling and ecchymosis. Tenderness.      Cervical back: She exhibits decreased range of motion, tenderness and pain.      Lumbar back: She exhibits tenderness and pain.      Comments: Moderately exaggerated tenderness with light palpation of the right sciatic notch   Skin:     General: Skin is warm.      Capillary " Refill: Capillary refill takes less than 2 seconds.   Neurological:      Mental Status: She is alert and oriented to person, place, and time.   Psychiatric:         Behavior: Behavior normal.         Assessment/Plan   Diagnoses and all orders for this visit:    1. Migraine without aura and with status migrainosus, not intractable (Primary)  -     CBC & Differential; Future  -     Comprehensive Metabolic Panel; Future    2. Chronic cervical pain  -     XR Spine Cervical 2 or 3 View; Future    3. Chronic midline low back pain with right-sided sciatica  -     XR Spine Lumbar 2 or 3 View; Future    4. Chronic hip pain, bilateral  -     XR Hips Bilateral With or Without Pelvis 2 View; Future  -     triamcinolone acetonide (KENALOG-40) injection 80 mg  -     ketorolac (TORADOL) injection 60 mg  -     nabumetone (Relafen) 750 MG tablet; Take 1 tablet by mouth 2 (Two) Times a Day As Needed for Mild Pain .  Dispense: 60 tablet; Refill: 3    5. Chronic pain of left ankle  -     XR Ankle 3+ View Left; Future    6. Ringworm, body  -     ketoconazole (NIZORAL) 2 % cream; Apply  topically to the appropriate area as directed Daily.  Dispense: 30 g; Refill: 0    7. Screening for hypercholesterolemia  -     Lipid panel; Future    8. Screening for hypothyroidism  -     TSH; Future    9. Need for hepatitis C screening test  -     Hepatitis C Antibody; Future    10. Encounter to establish care      Continue on current medications as previously prescribed   Complete fasting lab work as ordered and will notify of results when available  Complete x-rays as ordered and will notify of results when available  Kenalog 80 mg given IM in office  Educated on possible side effects of steroidal medications  Toradol 60 mg given IM in office  Encouraged not to take any other NSAIDs for at least 24 hours post injection  Begin nabumetone as prescribed  Educated on possible side effects of this medication including but not limited to increased risk for  gastrointestinal bleeding  Encouraged not to take any other NSAIDs including but not limited to Advil, ibuprofen, Motrin, Naprosyn or Aleve while on this medication if may increased risk for gastrointestinal bleeding  Encouraged to take this medication with food in order to reduce GI discomfort  Educated to watch for signs of possible GI bleeding such as dark, black or tarry looking stools  Begin ketoconazole cream as prescribed  Encouraged to cleanse areas of rash daily with antibacterial soap and water and leave open to air to dry  I spent 49 minutes charting and in face to face contact with patient.  Greater than 50% of this time was spent counseling patient and discussing plan of care.  Return in about 4 weeks (around 4/2/2021) for Annual physical.'          This document has been electronically signed by DANN Alexandra on March 8, 2021 06:48 CST

## 2021-03-09 ENCOUNTER — TELEPHONE (OUTPATIENT)
Dept: FAMILY MEDICINE CLINIC | Facility: CLINIC | Age: 41
End: 2021-03-09

## 2021-03-09 NOTE — TELEPHONE ENCOUNTER
Per DANN Colon, Ms. Arango has been called with recent multiple x-ray results & recommendations.  Continue current medications and follow-up as planned or sooner if any problems.       ----- Message from DANN Alexandra sent at 3/7/2021  6:10 PM CST -----  Normal left ankle x-ray but does show that she has high arch.  This could cause stress and pain when walking.  If she would like I can refer her to podiatry.  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  X-ray of the cervical spine did show degenerative disc disease.  Bilateral hip x-rays were within normal limits.  If cervical pain continues I can order an MRI of her neck.  Her insurance company may require physical therapy prior to the MRI.

## 2021-08-17 ENCOUNTER — OFFICE VISIT (OUTPATIENT)
Dept: OBSTETRICS AND GYNECOLOGY | Facility: CLINIC | Age: 41
End: 2021-08-17

## 2021-08-17 VITALS
SYSTOLIC BLOOD PRESSURE: 116 MMHG | HEIGHT: 61 IN | DIASTOLIC BLOOD PRESSURE: 74 MMHG | BODY MASS INDEX: 27 KG/M2 | WEIGHT: 143 LBS

## 2021-08-17 DIAGNOSIS — E89.40 SURGICAL MENOPAUSE ON HORMONE REPLACEMENT THERAPY: Primary | ICD-10-CM

## 2021-08-17 DIAGNOSIS — Z79.890 SURGICAL MENOPAUSE ON HORMONE REPLACEMENT THERAPY: Primary | ICD-10-CM

## 2021-08-17 DIAGNOSIS — Z12.31 ENCOUNTER FOR SCREENING MAMMOGRAM FOR MALIGNANT NEOPLASM OF BREAST: ICD-10-CM

## 2021-08-17 DIAGNOSIS — N76.0 RECURRENT VAGINITIS: ICD-10-CM

## 2021-08-17 PROCEDURE — 99213 OFFICE O/P EST LOW 20 MIN: CPT | Performed by: NURSE PRACTITIONER

## 2021-08-17 RX ORDER — FLUCONAZOLE 150 MG/1
TABLET ORAL
Qty: 2 TABLET | Refills: 4 | OUTPATIENT
Start: 2021-08-17 | End: 2023-02-16

## 2021-08-17 RX ORDER — ESTRADIOL 0.1 MG/D
1 FILM, EXTENDED RELEASE TRANSDERMAL 2 TIMES WEEKLY
Qty: 24 PATCH | Refills: 6 | Status: SHIPPED | OUTPATIENT
Start: 2021-08-19 | End: 2022-03-03

## 2021-08-17 RX ORDER — SULFAMETHOXAZOLE AND TRIMETHOPRIM 400; 80 MG/1; MG/1
1 TABLET ORAL DAILY PRN
Qty: 30 TABLET | Refills: 6 | OUTPATIENT
Start: 2021-08-17 | End: 2023-02-16

## 2021-08-20 NOTE — PROGRESS NOTES
Subjective   Karla Arango is a 41 y.o. refill on medications    Pt presents with desire for refill on her estradiol patches, bactrim, and diflucan.  She had a total hysterectomy in her early 20's due to severe endometriosis.  She has been on oral estrogen in the past, but reports the patches work better for her.  She is fully aware of risks of blood clots and stroke with estrogen being increased due to her smoking.  Her history also includes chronic vaginal infections after intercourse, they only thing that helps is bactrim and diflucan after intercourse.  She declines breast and pelvic exam today, but is agreeable to mammogram.        The following portions of the patient's history were reviewed and updated as appropriate: allergies, current medications, past family history, past medical history, past social history, past surgical history and problem list.    Review of Systems   Constitutional: Negative for chills, diaphoresis, fatigue, fever and unexpected weight change.   Respiratory: Negative for apnea, chest tightness and shortness of breath.    Cardiovascular: Negative for chest pain, palpitations and leg swelling.   Gastrointestinal: Negative for abdominal distention, abdominal pain, constipation and diarrhea.   Genitourinary: Negative for decreased urine volume, difficulty urinating, dyspareunia, dysuria, enuresis, flank pain, frequency, genital sores, hematuria, pelvic pain, urgency, vaginal bleeding, vaginal discharge and vaginal pain.   Skin: Negative for rash.   Neurological: Negative for headaches.   Psychiatric/Behavioral: Negative for sleep disturbance and suicidal ideas.         Objective   Physical Exam  Vitals and nursing note reviewed.   Constitutional:       General: She is awake. She is not in acute distress.     Appearance: Normal appearance. She is well-developed and well-groomed. She is not ill-appearing, toxic-appearing or diaphoretic.   Cardiovascular:      Rate and Rhythm: Normal  rate and regular rhythm.      Heart sounds: Normal heart sounds.   Pulmonary:      Effort: Pulmonary effort is normal.      Breath sounds: Normal breath sounds.   Skin:     General: Skin is warm and dry.   Neurological:      Mental Status: She is alert and oriented to person, place, and time.   Psychiatric:         Attention and Perception: Attention and perception normal.         Mood and Affect: Mood and affect normal.         Speech: Speech normal.         Behavior: Behavior normal. Behavior is cooperative.           Assessment/Plan   Diagnoses and all orders for this visit:    1. Surgical menopause on hormone replacement therapy (Primary)  -     estradiol (VIVELLE-DOT) 0.1 MG/24HR patch; Place 1 patch on the skin as directed by provider 2 (Two) Times a Week.  Dispense: 24 patch; Refill: 6  Plan to continue due to benefits for bone and heart health.  Pt aware of adverse risks due to her age and smoking.      2. Encounter for screening mammogram for malignant neoplasm of breast  -     Mammo Screening Digital Tomosynthesis Bilateral With CAD; Future    3. Recurrent vaginitis  -     sulfamethoxazole-trimethoprim (Bactrim) 400-80 MG tablet; Take 1 tablet by mouth Daily As Needed (after intercourse).  Dispense: 30 tablet; Refill: 6  -     fluconazole (Diflucan) 150 MG tablet; Take 1 tablet by mouth today and repeat in 4 days.  Dispense: 2 tablet; Refill: 4    RBA Estradiol patches, bactrim, and diflucan and reviewed potential side effects.  Pt will schedule mammogram today.  RTC for annual gyneclogical exam or sooner if needed.

## 2022-03-02 DIAGNOSIS — Z79.890 SURGICAL MENOPAUSE ON HORMONE REPLACEMENT THERAPY: ICD-10-CM

## 2022-03-02 DIAGNOSIS — E89.40 SURGICAL MENOPAUSE ON HORMONE REPLACEMENT THERAPY: ICD-10-CM

## 2022-03-03 RX ORDER — ESTRADIOL 0.1 MG/D
PATCH, EXTENDED RELEASE TRANSDERMAL
Qty: 8 EACH | Refills: 4 | Status: SHIPPED | OUTPATIENT
Start: 2022-03-03 | End: 2022-08-08

## 2022-08-08 DIAGNOSIS — Z79.890 SURGICAL MENOPAUSE ON HORMONE REPLACEMENT THERAPY: ICD-10-CM

## 2022-08-08 DIAGNOSIS — E89.40 SURGICAL MENOPAUSE ON HORMONE REPLACEMENT THERAPY: ICD-10-CM

## 2022-08-08 RX ORDER — ESTRADIOL 0.1 MG/D
PATCH, EXTENDED RELEASE TRANSDERMAL
Qty: 8 EACH | Refills: 4 | Status: SHIPPED | OUTPATIENT
Start: 2022-08-08 | End: 2023-01-06 | Stop reason: SDUPTHER

## 2022-09-06 DIAGNOSIS — N76.0 RECURRENT VAGINITIS: ICD-10-CM

## 2022-09-06 RX ORDER — SULFAMETHOXAZOLE AND TRIMETHOPRIM 400; 80 MG/1; MG/1
TABLET ORAL
Qty: 30 TABLET | Refills: 6 | OUTPATIENT
Start: 2022-09-06

## 2023-01-03 DIAGNOSIS — E89.40 SURGICAL MENOPAUSE ON HORMONE REPLACEMENT THERAPY: ICD-10-CM

## 2023-01-03 DIAGNOSIS — Z79.890 SURGICAL MENOPAUSE ON HORMONE REPLACEMENT THERAPY: ICD-10-CM

## 2023-01-06 RX ORDER — ESTRADIOL 0.1 MG/D
PATCH, EXTENDED RELEASE TRANSDERMAL
Qty: 8 EACH | Refills: 11 | Status: SHIPPED | OUTPATIENT
Start: 2023-01-06